# Patient Record
Sex: FEMALE | Race: BLACK OR AFRICAN AMERICAN | NOT HISPANIC OR LATINO
[De-identification: names, ages, dates, MRNs, and addresses within clinical notes are randomized per-mention and may not be internally consistent; named-entity substitution may affect disease eponyms.]

---

## 2022-10-20 ENCOUNTER — TRANSCRIPTION ENCOUNTER (OUTPATIENT)
Age: 32
End: 2022-10-20

## 2022-10-21 ENCOUNTER — TRANSCRIPTION ENCOUNTER (OUTPATIENT)
Age: 32
End: 2022-10-21

## 2022-10-21 ENCOUNTER — RESULT REVIEW (OUTPATIENT)
Age: 32
End: 2022-10-21

## 2022-10-21 ENCOUNTER — INPATIENT (INPATIENT)
Facility: HOSPITAL | Age: 32
LOS: 0 days | Discharge: HOME | End: 2022-10-22
Attending: STUDENT IN AN ORGANIZED HEALTH CARE EDUCATION/TRAINING PROGRAM | Admitting: STUDENT IN AN ORGANIZED HEALTH CARE EDUCATION/TRAINING PROGRAM

## 2022-10-21 VITALS
HEART RATE: 73 BPM | TEMPERATURE: 98 F | DIASTOLIC BLOOD PRESSURE: 78 MMHG | OXYGEN SATURATION: 99 % | SYSTOLIC BLOOD PRESSURE: 136 MMHG | RESPIRATION RATE: 16 BRPM

## 2022-10-21 DIAGNOSIS — R18.8 OTHER ASCITES: ICD-10-CM

## 2022-10-21 LAB
ALBUMIN SERPL ELPH-MCNC: 4.1 G/DL — SIGNIFICANT CHANGE UP (ref 3.3–5)
ALP SERPL-CCNC: 66 U/L — SIGNIFICANT CHANGE UP (ref 40–120)
ALT FLD-CCNC: 9 U/L — SIGNIFICANT CHANGE UP (ref 4–33)
ANION GAP SERPL CALC-SCNC: 10 MMOL/L — SIGNIFICANT CHANGE UP (ref 7–14)
APPEARANCE UR: CLEAR — SIGNIFICANT CHANGE UP
APTT BLD: 28 SEC — SIGNIFICANT CHANGE UP (ref 27–36.3)
AST SERPL-CCNC: 14 U/L — SIGNIFICANT CHANGE UP (ref 4–32)
BACTERIA # UR AUTO: ABNORMAL
BASOPHILS # BLD AUTO: 0.03 K/UL — SIGNIFICANT CHANGE UP (ref 0–0.2)
BASOPHILS NFR BLD AUTO: 0.5 % — SIGNIFICANT CHANGE UP (ref 0–2)
BILIRUB SERPL-MCNC: 0.3 MG/DL — SIGNIFICANT CHANGE UP (ref 0.2–1.2)
BILIRUB UR-MCNC: NEGATIVE — SIGNIFICANT CHANGE UP
BLD GP AB SCN SERPL QL: NEGATIVE — SIGNIFICANT CHANGE UP
BUN SERPL-MCNC: 6 MG/DL — LOW (ref 7–23)
CALCIUM SERPL-MCNC: 8.9 MG/DL — SIGNIFICANT CHANGE UP (ref 8.4–10.5)
CHLORIDE SERPL-SCNC: 101 MMOL/L — SIGNIFICANT CHANGE UP (ref 98–107)
CO2 SERPL-SCNC: 24 MMOL/L — SIGNIFICANT CHANGE UP (ref 22–31)
COLOR SPEC: ABNORMAL
CREAT SERPL-MCNC: 0.74 MG/DL — SIGNIFICANT CHANGE UP (ref 0.5–1.3)
DIFF PNL FLD: ABNORMAL
EGFR: 111 ML/MIN/1.73M2 — SIGNIFICANT CHANGE UP
EOSINOPHIL # BLD AUTO: 0.06 K/UL — SIGNIFICANT CHANGE UP (ref 0–0.5)
EOSINOPHIL NFR BLD AUTO: 1 % — SIGNIFICANT CHANGE UP (ref 0–6)
EPI CELLS # UR: 5 /HPF — SIGNIFICANT CHANGE UP (ref 0–5)
GLUCOSE SERPL-MCNC: 106 MG/DL — HIGH (ref 70–99)
GLUCOSE UR QL: NEGATIVE — SIGNIFICANT CHANGE UP
HCG SERPL-ACNC: 1143 MIU/ML — SIGNIFICANT CHANGE UP
HCT VFR BLD CALC: 32.9 % — LOW (ref 34.5–45)
HGB BLD-MCNC: 9.8 G/DL — LOW (ref 11.5–15.5)
IANC: 4.38 K/UL — SIGNIFICANT CHANGE UP (ref 1.8–7.4)
IMM GRANULOCYTES NFR BLD AUTO: 0.3 % — SIGNIFICANT CHANGE UP (ref 0–0.9)
INR BLD: 1.19 RATIO — HIGH (ref 0.88–1.16)
KETONES UR-MCNC: ABNORMAL
LEUKOCYTE ESTERASE UR-ACNC: ABNORMAL
LYMPHOCYTES # BLD AUTO: 1.1 K/UL — SIGNIFICANT CHANGE UP (ref 1–3.3)
LYMPHOCYTES # BLD AUTO: 18.4 % — SIGNIFICANT CHANGE UP (ref 13–44)
MCHC RBC-ENTMCNC: 23.3 PG — LOW (ref 27–34)
MCHC RBC-ENTMCNC: 29.8 GM/DL — LOW (ref 32–36)
MCV RBC AUTO: 78.1 FL — LOW (ref 80–100)
MONOCYTES # BLD AUTO: 0.38 K/UL — SIGNIFICANT CHANGE UP (ref 0–0.9)
MONOCYTES NFR BLD AUTO: 6.4 % — SIGNIFICANT CHANGE UP (ref 2–14)
NEUTROPHILS # BLD AUTO: 4.38 K/UL — SIGNIFICANT CHANGE UP (ref 1.8–7.4)
NEUTROPHILS NFR BLD AUTO: 73.4 % — SIGNIFICANT CHANGE UP (ref 43–77)
NITRITE UR-MCNC: NEGATIVE — SIGNIFICANT CHANGE UP
NRBC # BLD: 0 /100 WBCS — SIGNIFICANT CHANGE UP (ref 0–0)
NRBC # FLD: 0 K/UL — SIGNIFICANT CHANGE UP (ref 0–0)
PH UR: 6 — SIGNIFICANT CHANGE UP (ref 5–8)
PLATELET # BLD AUTO: 409 K/UL — HIGH (ref 150–400)
POTASSIUM SERPL-MCNC: 3.5 MMOL/L — SIGNIFICANT CHANGE UP (ref 3.5–5.3)
POTASSIUM SERPL-SCNC: 3.5 MMOL/L — SIGNIFICANT CHANGE UP (ref 3.5–5.3)
PROT SERPL-MCNC: 7.4 G/DL — SIGNIFICANT CHANGE UP (ref 6–8.3)
PROT UR-MCNC: ABNORMAL
PROTHROM AB SERPL-ACNC: 13.8 SEC — HIGH (ref 10.5–13.4)
RBC # BLD: 4.21 M/UL — SIGNIFICANT CHANGE UP (ref 3.8–5.2)
RBC # FLD: 16.8 % — HIGH (ref 10.3–14.5)
RBC CASTS # UR COMP ASSIST: 3 /HPF — SIGNIFICANT CHANGE UP (ref 0–4)
RH IG SCN BLD-IMP: POSITIVE — SIGNIFICANT CHANGE UP
RH IG SCN BLD-IMP: POSITIVE — SIGNIFICANT CHANGE UP
SARS-COV-2 RNA SPEC QL NAA+PROBE: SIGNIFICANT CHANGE UP
SODIUM SERPL-SCNC: 135 MMOL/L — SIGNIFICANT CHANGE UP (ref 135–145)
SP GR SPEC: 1 — LOW (ref 1.01–1.05)
UROBILINOGEN FLD QL: SIGNIFICANT CHANGE UP
WBC # BLD: 5.97 K/UL — SIGNIFICANT CHANGE UP (ref 3.8–10.5)
WBC # FLD AUTO: 5.97 K/UL — SIGNIFICANT CHANGE UP (ref 3.8–10.5)
WBC UR QL: 9 /HPF — HIGH (ref 0–5)

## 2022-10-21 PROCEDURE — 99285 EMERGENCY DEPT VISIT HI MDM: CPT

## 2022-10-21 PROCEDURE — 88305 TISSUE EXAM BY PATHOLOGIST: CPT | Mod: 26

## 2022-10-21 PROCEDURE — 76817 TRANSVAGINAL US OBSTETRIC: CPT | Mod: 26

## 2022-10-21 PROCEDURE — 58120 DILATION AND CURETTAGE: CPT

## 2022-10-21 PROCEDURE — 49322 LAPAROSCOPY ASPIRATION: CPT

## 2022-10-21 RX ORDER — FENTANYL CITRATE 50 UG/ML
25 INJECTION INTRAVENOUS
Refills: 0 | Status: DISCONTINUED | OUTPATIENT
Start: 2022-10-21 | End: 2022-10-22

## 2022-10-21 RX ORDER — OXYCODONE HYDROCHLORIDE 5 MG/1
5 TABLET ORAL ONCE
Refills: 0 | Status: DISCONTINUED | OUTPATIENT
Start: 2022-10-21 | End: 2022-10-22

## 2022-10-21 RX ORDER — HYDROMORPHONE HYDROCHLORIDE 2 MG/ML
0.5 INJECTION INTRAMUSCULAR; INTRAVENOUS; SUBCUTANEOUS
Refills: 0 | Status: DISCONTINUED | OUTPATIENT
Start: 2022-10-21 | End: 2022-10-22

## 2022-10-21 RX ORDER — OXYCODONE HYDROCHLORIDE 5 MG/1
1 TABLET ORAL
Qty: 5 | Refills: 0
Start: 2022-10-21

## 2022-10-21 RX ORDER — SODIUM CHLORIDE 9 MG/ML
1000 INJECTION INTRAMUSCULAR; INTRAVENOUS; SUBCUTANEOUS ONCE
Refills: 0 | Status: COMPLETED | OUTPATIENT
Start: 2022-10-21 | End: 2022-10-21

## 2022-10-21 RX ORDER — HYDROMORPHONE HYDROCHLORIDE 2 MG/ML
0.5 INJECTION INTRAMUSCULAR; INTRAVENOUS; SUBCUTANEOUS
Refills: 0 | Status: DISCONTINUED | OUTPATIENT
Start: 2022-10-21 | End: 2022-10-21

## 2022-10-21 RX ORDER — ACETAMINOPHEN 500 MG
1000 TABLET ORAL ONCE
Refills: 0 | Status: COMPLETED | OUTPATIENT
Start: 2022-10-21 | End: 2022-10-21

## 2022-10-21 RX ORDER — SODIUM CHLORIDE 9 MG/ML
1000 INJECTION, SOLUTION INTRAVENOUS
Refills: 0 | Status: DISCONTINUED | OUTPATIENT
Start: 2022-10-21 | End: 2022-10-21

## 2022-10-21 RX ORDER — SODIUM CHLORIDE 9 MG/ML
1000 INJECTION, SOLUTION INTRAVENOUS
Refills: 0 | Status: DISCONTINUED | OUTPATIENT
Start: 2022-10-21 | End: 2022-10-22

## 2022-10-21 RX ORDER — ONDANSETRON 8 MG/1
4 TABLET, FILM COATED ORAL ONCE
Refills: 0 | Status: DISCONTINUED | OUTPATIENT
Start: 2022-10-21 | End: 2022-10-22

## 2022-10-21 RX ADMIN — SODIUM CHLORIDE 1000 MILLILITER(S): 9 INJECTION INTRAMUSCULAR; INTRAVENOUS; SUBCUTANEOUS at 15:54

## 2022-10-21 RX ADMIN — Medication 400 MILLIGRAM(S): at 15:54

## 2022-10-21 RX ADMIN — SODIUM CHLORIDE 1000 MILLILITER(S): 9 INJECTION INTRAMUSCULAR; INTRAVENOUS; SUBCUTANEOUS at 18:48

## 2022-10-21 RX ADMIN — SODIUM CHLORIDE 125 MILLILITER(S): 9 INJECTION, SOLUTION INTRAVENOUS at 23:30

## 2022-10-21 NOTE — ED PROVIDER NOTE - CLINICAL SUMMARY MEDICAL DECISION MAKING FREE TEXT BOX
30 yo  female PMhx asthma, presenting with lower  abdominal pain, vaginal bleeding since Tuesday. Concern for missed  vs ectopic, vs other causes of vaginal bleeding such as fibroids, polyps, etc. Will get CBC, CMP, type and screen, UA/UC, and TVUS.

## 2022-10-21 NOTE — ED ADULT TRIAGE NOTE - CHIEF COMPLAINT QUOTE
Pt c/o abd pain, reports she went to urgent care and was told she was pregnant-sent to ER for US. endorsing vaginal bleeding. reports LMP late july- states "they are irregular."

## 2022-10-21 NOTE — ED PROVIDER NOTE - OBJECTIVE STATEMENT
30 yo  female PMhx asthma, presenting with lower  abdominal pain, vaginal bleeding since Tuesday. Patient went to urgent care, where they told her she was pregnant today. Patient with irregular periods, unsure LMP (possibly July). No fever. Pain described as intense cramping. Patient has been experience vomiting only Patient denies lightheadedness, CP, SOB, and dysuria. 32 yo  female PMhx asthma, presenting with lower  abdominal pain, vaginal bleeding since Tuesday. Patient went to urgent care, where they told her she was pregnant today. Patient with irregular periods, unsure LMP (possibly July). No fever. Pain described as intense cramping. Patient has been experience vomiting only with pain medication. Patient denies lightheadedness, CP, SOB, and dysuria.

## 2022-10-21 NOTE — ED PROVIDER NOTE - PROGRESS NOTE DETAILS
MD Alice (PGY-1) Free fluid in abdomen on US. Concern for ectopic vs hemorraghic cyst. Patient reassessed and stable. Denies pain. OB was consulted. Plan for OR. Will get pre-op labs and COVID.

## 2022-10-21 NOTE — CHART NOTE - NSCHARTNOTEFT_GEN_A_CORE
Attending Note     Pt seen and examined    LMP 2022 presents to the ER with 3 days of abdominal pain and vaginal bleeding   Today she went to urgent care and was noted to be pregnant  She was sent to the ER where she was noted to have small amount of vaginal bleeding and diffuse lower abdominal pain   undesired pregnancy   TVUS + free fluid concerning for small hemoperitoneum, thickened endometrium   SSE 1 cm dilated with blood   hcg 1100 and Rh +   Discussed with pt diagnosis of pregnancy of unknown location and hemoperitoneum.   Recommend diagnostic laparoscopy to r/o ruptured ectopic  and D & C to evaluate for IUP   PT is aware of exam under anesthesia by learners  Reviewed with pt risks of the procedure including but not limited bleeding, infection, damage to surrounding organs and uterine perforation.    ROSALES Khanna MD

## 2022-10-21 NOTE — BRIEF OPERATIVE NOTE - NSICDXBRIEFPROCEDURE_GEN_ALL_CORE_FT
PROCEDURES:  Diagnostic laparoscopy in female 21-Oct-2022 23:25:10  Kala Bello&JUDY (dilatation and curettage, scraping of uterus) 21-Oct-2022 23:26:12  Kala Bello

## 2022-10-21 NOTE — ED PROVIDER NOTE - PHYSICAL EXAMINATION
Const: not in acute distress  Eyes: no conjunctival injection  HEENT: Head NCAT, Moist MM.  Neck: Trachea midline.   CVS: +S1/S2, Peripheral pulses 2+ and equal in all extremities.  RESP: Unlabored respiratory effort. Clear to auscultation bilaterally.  GI: Tender in mid lower abdomen/Nondistended, No CVA tenderness b/l.   : Cervical os open 1 cm at most, no adnexal tenderness  Chaperone: Helen Bloch  MSK: Normocephalic/Atraumatic, No Lower Extremities edema b/l.   Skin: Intact.   Neuro: CNs II-XII grossly intact. Motor & Sensation grossly intact.  Psych: Awake, Alert, & Oriented (AAO) x3.

## 2022-10-21 NOTE — H&P ADULT - NSHPPOADEEPVENOUSTHROMB_GEN_A_CORE
no
Cardiomyopathy, Idiopathic  follows with [facility]  Cerebral Palsy  since  birth  Cholelithiasis, resolved s/p cholecystectomy    Chronic Lung Disease of Premat  follows with [facility] Pulmonology  CVA (Cerebral Vascular Accident)  Onset date unknown, noted on MRI from 5/2010  Hydrocephalus    Reactive Airway Disease  receives albuterol and xoponex treatments at home  S/P  Shunt  x2 with multiple shunt revisions  Sickle Cell Disease    Strabismus  s/p surgery

## 2022-10-21 NOTE — ED ADULT NURSE NOTE - OBJECTIVE STATEMENT
a&ox3, c/o abd pain with vag bleeding since Tuesday. denies fever, chilles or dysuria. known preg [facility]. denies heavy bleeding at present. resp even and unlabored. well appearing. bld sent to lab as ordered and meds given as ordered

## 2022-10-21 NOTE — ED PROVIDER NOTE - ATTENDING CONTRIBUTION TO CARE
DR. BLOCH, ATTENDING MD-  I performed a face to face bedside interview with patient regarding history of present illness, review of symptoms and past medical history. I completed an independent physical exam.  I have discussed patient's plan of care with the resident.  patient examined well appearing NAD HEENT nml heart s1s2. lungs clear abd soft tender supra pubic area, no CVA tenderness, extrem no edema, pelvic supervised, no tenderness. exam as per resident

## 2022-10-21 NOTE — H&P ADULT - NSHPLABSRESULTS_GEN_ALL_CORE
LABS:                        9.8    5.97  )-----------( 409      ( 21 Oct 2022 16:13 )             32.9     10-    135  |  101  |  6<L>  ----------------------------<  106<H>  3.5   |  24  |  0.74    Ca    8.9      21 Oct 2022 16:13    TPro  7.4  /  Alb  4.1  /  TBili  0.3  /  DBili  x   /  AST  14  /  ALT  9   /  AlkPhos  66  10-    Urinalysis Basic - ( 21 Oct 2022 16:13 )    Color: Light Brown / Appearance: Clear / S.004 / pH: x  Gluc: x / Ketone: Trace  / Bili: Negative / Urobili: <2 mg/dL   Blood: x / Protein: 30 mg/dL / Nitrite: Negative   Leuk Esterase: Large / RBC: 3 /HPF / WBC 9 /HPF   Sq Epi: x / Non Sq Epi: 5 /HPF / Bacteria: Occasional          RADIOLOGY STUDIES:  < from: US Transvaginal, OB (10.21.22 @ 16:34) >  ACC: [id number] EXAM:  US OB TRANSVAGINAL                          PROCEDURE DATE:  10/21/2022          INTERPRETATION:  CLINICAL INFORMATION: Vaginal bleeding. Lower   abdominopelvic pain. Positive urine pregnancy test.    LMP: Irregular menses, possibly 2022    COMPARISON: None available.    TECHNIQUE:  Endovaginal pelvic sonogram only. Color and Spectral Doppler was   performed.    FINDINGS:  Uterus: 10.3 cm x 5.8 cm x 5.7 cm. No focal myometrial mass. No   intrauterine gestation is identified.  Endometrium: The endometrial cavity is expanded with avascular echogenic   material, likely reflecting blood products. The endometrium is difficult   to delineate from the presumed blood products, measuring approximately 5   mm in thickness.    Right ovary: 3.3 cm x 2.1 cm x 1.7 cm. Within normal limits. Normal   arterial and venous waveforms.  Left ovary: 4.1 cm x 1.8 cm x 2.0 cm. Within normal limits. There is a   left corpus luteum that measures 1.5 x 1.3 x 0.9 cm. Normal arterial and   venous waveforms.    No adnexal mass identified.    Fluid: Small volume of complex fluid with diffuse low-level echoes   visualized along the bilateral adnexa and cul-de-sac.    IMPRESSION:  No intrauterine or extrauterine pregnancy identified, representing a   pregnancy of unknown location. Differential includes an early normal   pregnancy, failed pregnancy, or occult ectopic pregnancy.    Expanded endometrial cavity containing avascular echogenic material,   consistent with blood products. Small volume of complex pelvic fluid,   concerning for hemoperitoneum. A ruptured occult ectopic pregnancy is not   excluded.    Findings were discussed with Dr. Bloch 10/21/2022 4:52 PM by Dr. Mcnamara with read back confirmation.    --- End of Report ---            MARY MCNAMARA MD; Attending Radiologist  This document has been electronically signed. Oct 21 2022  4:56PM    < end of copied text > LABS:    Type + Screen (10.21.22 @ 17:12)   ABO Interpretation: B   Rh Interpretation: Positive   Antibody Screen: Negative     HCG Quantitative, Serum (10.21.22 @ 16:13)   HCG Quantitative, Serum: 1143.0                        9.8    5.97  )-----------( 409      ( 21 Oct 2022 16:13 )             32.9     10    135  |  101  |  6<L>  ----------------------------<  106<H>  3.5   |  24  |  0.74    Ca    8.9      21 Oct 2022 16:13    TPro  7.4  /  Alb  4.1  /  TBili  0.3  /  DBili  x   /  AST  14  /  ALT  9   /  AlkPhos  66  10-21    Urinalysis Basic - ( 21 Oct 2022 16:13 )    Color: Light Brown / Appearance: Clear / S.004 / pH: x  Gluc: x / Ketone: Trace  / Bili: Negative / Urobili: <2 mg/dL   Blood: x / Protein: 30 mg/dL / Nitrite: Negative   Leuk Esterase: Large / RBC: 3 /HPF / WBC 9 /HPF   Sq Epi: x / Non Sq Epi: 5 /HPF / Bacteria: Occasional          RADIOLOGY STUDIES:  < from: US Transvaginal, OB (10.21.22 @ 16:34) >  ACC: [id number] EXAM:  US OB TRANSVAGINAL                          PROCEDURE DATE:  10/21/2022          INTERPRETATION:  CLINICAL INFORMATION: Vaginal bleeding. Lower   abdominopelvic pain. Positive urine pregnancy test.    LMP: Irregular menses, possibly 2022    COMPARISON: None available.    TECHNIQUE:  Endovaginal pelvic sonogram only. Color and Spectral Doppler was   performed.    FINDINGS:  Uterus: 10.3 cm x 5.8 cm x 5.7 cm. No focal myometrial mass. No   intrauterine gestation is identified.  Endometrium: The endometrial cavity is expanded with avascular echogenic   material, likely reflecting blood products. The endometrium is difficult   to delineate from the presumed blood products, measuring approximately 5   mm in thickness.    Right ovary: 3.3 cm x 2.1 cm x 1.7 cm. Within normal limits. Normal   arterial and venous waveforms.  Left ovary: 4.1 cm x 1.8 cm x 2.0 cm. Within normal limits. There is a   left corpus luteum that measures 1.5 x 1.3 x 0.9 cm. Normal arterial and   venous waveforms.    No adnexal mass identified.    Fluid: Small volume of complex fluid with diffuse low-level echoes   visualized along the bilateral adnexa and cul-de-sac.    IMPRESSION:  No intrauterine or extrauterine pregnancy identified, representing a   pregnancy of unknown location. Differential includes an early normal   pregnancy, failed pregnancy, or occult ectopic pregnancy.    Expanded endometrial cavity containing avascular echogenic material,   consistent with blood products. Small volume of complex pelvic fluid,   concerning for hemoperitoneum. A ruptured occult ectopic pregnancy is not   excluded.    Findings were discussed with Dr. Bloch 10/21/2022 4:52 PM by Dr. Mcnamara with read back confirmation.    --- End of Report ---            MARY MCNAMARA MD; Attending Radiologist  This document has been electronically signed. Oct 21 2022  4:56PM    < end of copied text >

## 2022-10-21 NOTE — H&P ADULT - NSHPPHYSICALEXAM_GEN_ALL_CORE
Vital Signs Last 24 Hrs  T(C): 36.7 (21 Oct 2022 17:00), Max: 36.7 (21 Oct 2022 13:26)  T(F): 98 (21 Oct 2022 17:00), Max: 98.1 (21 Oct 2022 13:26)  HR: 87 (21 Oct 2022 17:00) (73 - 87)  BP: 134/80 (21 Oct 2022 17:00) (134/80 - 136/78)  RR: 17 (21 Oct 2022 17:00) (16 - 17)  SpO2: 100% (21 Oct 2022 17:00) (99% - 100%)    Parameters below as of 21 Oct 2022 17:00  Patient On (Oxygen Delivery Method): room air         PHYSICAL EXAM:  CHEST/LUNG: Clear to percussion bilaterally; No rales, rhonchi, wheezing, or rubs  HEART: Regular rate and rhythm; No murmurs, rubs, or gallops  ABDOMEN: Soft, Nontender, Nondistended; Bowel sounds present  EXTREMITIES:  2+ Peripheral Pulses, No clubbing, cyanosis, or edema  PELVIC:        EXTERNAL GENITALIA: Normal. No rashes or lesions noted.         VAGINA: healthy pink mucosa, no gross lesions, no discharge noted, no blood noted.          CERVIX: no lesions. closed, no active bleeding through os. no CMT noted.        UTERUS: normal size, nontender, mobile        ADNEXA: no adnexal masses or tenderness appreciated. Vital Signs Last 24 Hrs  T(C): 36.7 (21 Oct 2022 17:00), Max: 36.7 (21 Oct 2022 13:26)  T(F): 98 (21 Oct 2022 17:00), Max: 98.1 (21 Oct 2022 13:26)  HR: 87 (21 Oct 2022 17:00) (73 - 87)  BP: 134/80 (21 Oct 2022 17:00) (134/80 - 136/78)  RR: 17 (21 Oct 2022 17:00) (16 - 17)  SpO2: 100% (21 Oct 2022 17:00) (99% - 100%)    Parameters below as of 21 Oct 2022 17:00  Patient On (Oxygen Delivery Method): room air         PHYSICAL EXAM:  ABDOMEN: Soft, Nontender, Mildly distended;   EXTREMITIES:  2+ Peripheral Pulses, No clubbing, cyanosis, or edema  PELVIC:        EXTERNAL GENITALIA: Normal. No rashes or lesions noted.         VAGINA: healthy pink mucosa, no gross lesions, no discharge noted, minimal blood noted in vaginal vault, negative pooling         CERVIX: no lesions. 0.5cm dilated. small clot noted at external cervical os. no active bleeding through os. no CMT noted.        UTERUS: normal size, nontender, mobile        ADNEXA: no adnexal masses or tenderness appreciated.

## 2022-10-21 NOTE — ED PROVIDER NOTE - NS ED ROS FT
CONST: no fevers, no chills, no trauma  EYES: no blurry vision   ENT: no sore throat  CV: no chest pain, no extremity swelling  RESP: no shortness of breath  ABD: no abdominal pain, no nausea, (+)  vomiting, no diarrhea, no melena  : no dysuria, no hematuria, no frequency  MSK: no back pain, no neck pain, no extremity pain  NEURO: no headache, no focal weakness, no dizziness  HEME: no bruising  SKIN: no rash CONST: no fevers, no chills, no trauma  EYES: no blurry vision   ENT: no sore throat  CV: no chest pain, no extremity swelling  RESP: no shortness of breath  ABD: (+) abdominal pain, no nausea, (+)  vomiting, no diarrhea, no melena  : no dysuria, no hematuria, no frequency, (+) vaginal bleeding  MSK: no back pain, no neck pain, no extremity pain  NEURO: no headache, no focal weakness, no dizziness  HEME: no bruising  SKIN: no rash

## 2022-10-21 NOTE — H&P ADULT - NSVTERISKREFERASSESS_GEN_ALL_CORE
1355 Patient presented to [facility] ED with complaint of lower abdominal pain and decreased fetal movement; started [facility] 6/3/2018; current S OB/GYN patient; denied abnormalities or concerns w/pregnancy; denied medical/surgical history; denied hx of verbal/physical/sexual abuse       06/04/18 1400   Maternal Vital Signs   Temp 98 °F (36.7 °C)   Temp Source Oral   Pulse (Heart Rate) 88   Resp Rate 18   O2 Sat (%) 99 %   Level of Consciousness Alert   /77   MAP (Calculated) 90   BP 1 Method Automatic   BP 1 Location Left arm   BP Patient Position At rest;Head of bed elevated (Comment degrees)   MEWS Score 1   Fetal Vital Signs   Mode External   Fetal Heart Rate 140   Uterine Activity   Uterine Resting Tone Relaxed   Pain 1   Pain Scale 1 Numeric (0 - 10)   Pain Intensity 1 8   Patient Stated Pain Goal 0   Pain Onset 1 0500   Pain Location 1 Abdomen   Pain Orientation 1 Lower   Pain Description 1 Constant; Aching; Throbbing   Spinal Dermatomes   Motor Function Ambulate w/o assistance   Oxygen Therapy   O2 Device Room air   Vaginal Discharge   Vaginal Discharge No   Vaginal Bleeding   Vaginal Bleeding No   Pre-Eclampsia Assessment   Headache No   Visual Disturbances No   Epigastric Pain No Refer to the Assessment tab to view/cancel completed assessment.

## 2022-10-21 NOTE — H&P ADULT - HISTORY OF PRESENT ILLNESS
HPI:     31y G_P_  Last Menstrual Period   presents with       PMHX;  PSHX;  POBHX;  PGYNHX:  SOCIAL:  Allergies    No Known Allergies    Intolerances      MEDS:      Vital Signs Last 24 Hrs  T(C): 36.7 (21 Oct 2022 17:00), Max: 36.7 (21 Oct 2022 13:26)  T(F): 98 (21 Oct 2022 17:00), Max: 98.1 (21 Oct 2022 13:26)  HR: 87 (21 Oct 2022 17:00) (73 - 87)  BP: 134/80 (21 Oct 2022 17:00) (134/80 - 136/78)  BP(mean): --  RR: 17 (21 Oct 2022 17:00) (16 - 17)  SpO2: 100% (21 Oct 2022 17:00) (99% - 100%)    Parameters below as of 21 Oct 2022 17:00  Patient On (Oxygen Delivery Method): room air         PHYSICAL EXAM:  CHEST/LUNG: Clear to percussion bilaterally; No rales, rhonchi, wheezing, or rubs  HEART: Regular rate and rhythm; No murmurs, rubs, or gallops  ABDOMEN: Soft, Nontender, Nondistended; Bowel sounds present  EXTREMITIES:  2+ Peripheral Pulses, No clubbing, cyanosis, or edema  PELVIC:        EXTERNAL GENITALIA: Normal. No rashes or lesions noted.         VAGINA: healthy pink mucosa, no gross lesions, no discharge noted, no blood noted.          CERVIX: no lesions. closed, no active bleeding through os. no CMt noted.        UTERUS: normal size, nontender, mobile        ADNEXA: no adnexal masses or tenderness appreciated.     HPI: 31y  (Last Menstrual Period: 2022) w/ PMHx of asthma presents with vaginal bleeding and lower abdominal pain x4 days. Patient's pain started on Tuesday as well as bleeding. Patient states that at that time the pain was 12/10. the pain eventually resolved and decreased to 10/10 on Wednesday. She noticed that she had heavy bleeding as well as a return in the severity of the pain and presented to the urgent care, where they told her she was pregnant today. Patient states that over the past 3 days she passed small clots and had bleeding as heavy as her normal period. Patient has a history of irregular periods, and so she is unsure of her LMP, though reports it was last in July of this year. She denies any use of birth control. She states that she vomited twice while trying to take pain medication earlier in the week, though denies fever, chills, chest pain, shortness of breath, diarrhea or constipation. She also denies lightheadedness, dizziness or dysuria.    PMHX; Asthma  PSHX; denies surgical hx  POBHX;   PGYNHX: denies hx of fibroids, ovarian cysts, endometriosis,   SOCIAL: denies e/t/d use  Allergies: NKDA  MEDS: Tylenol

## 2022-10-21 NOTE — BRIEF OPERATIVE NOTE - OPERATION/FINDINGS
Grossly normal uterus, fallopian tubes, and ovaries bilaterally. D&C completed and products floated. EMB sent to pathology. Grossly normal uterus, fallopian tubes, and ovaries bilaterally. D&C completed and products floated. EMC sent to pathology.

## 2022-10-21 NOTE — ASU DISCHARGE PLAN (ADULT/PEDIATRIC) - NURSING INSTRUCTIONS
Over the counter Pain medications are Motrin 600mg orally every 6 hours and Tylenol 1000mg orally every 6 hours if needed.    Next dose of Tylenol 1000mg, orally is at 4am if needed.     Next dose of Motrin 600mg, orally is at 5am if needed.

## 2022-10-21 NOTE — ED ADULT NURSE REASSESSMENT NOTE - NS ED NURSE REASSESS COMMENT FT1
a&ox4, resting in bed. denies pain or increasing vag bleeding. well appearing. stated she feels fine. oob, steady on her feet. resp even and unlabored.

## 2022-10-21 NOTE — H&P ADULT - ASSESSMENT
Plan for OR:   Neuro: IV pain medication prn  CV: Patient hemodynamically unstable.  Currently recieving ......      Anesthesia to be at bedside to stabilize patient.    vs. Patient hemodynamically stable- will continue to monitor vitals closely.   Pulm: saturating well on room air   GI: NPO for OR   : Perez to be placed intra-operatively.   Reproductive: -Ruptured ectopic pregnancy  vs.  Ectopic pregnancy (not candidate for medical therapy):  patient to go to OR for diagnostic laparoscopy, unilateral salpingectomy, possible unilateral oopherectomy, possible exploratory laparotomy.  Patient counseled on risks of surgery including bleeding, infection and damage to surrounding organs.  All questions/concerns of patient addressed. All consents signed with patient.    Heme: SCD's in OR for DVT ppx.  Aggressive and early ambulation post-operatively for DVT ppx.   ID: afebrile   FEN: LR@125.  Replete electrolytes prn   Dispo: To OR for procedure as detailed above      Patient seen and d/w     Nuha Del Cid, PGY2   31y  (Last Menstrual Period: 2022) w/ PMHx of asthma presents with vaginal bleeding and lower abdominal pain x4 days with imaging findings concerning for a ruptured ectopic pregnancy. Patient is hemodynamically stable, afebrile, H/H 9.8/32.9. Coags wnl. PE findings consistent with potential SAB vs ectopic pregnancy given clots observed at cervix. Patient mildly tender to deep palpation across lower abdomen with mild abdominal distension. TVUS findings significant for: No intrauterine or extrauterine pregnancy identified, representing a   pregnancy of unknown location. Differential includes an early normal pregnancy, failed pregnancy, or occult ectopic pregnancy. Expanded endometrial cavity containing avascular echogenic material, consistent with blood products. Small volume of complex pelvic fluid, concerning for hemoperitoneum. A ruptured occult ectopic pregnancy is not excluded. Patient is a surgical candidate for evacuation of hemoperitoneum with D+C, diagnostic laparoscopy, poss unilateral salpingectomy, possible ovarian cystectomy, possible oophorectomy, possible exploratory laparotomy.     Plan for OR: for evacuation of hemoperitoneum with D+C, diagnostic laparoscopy, poss unilateral salpingectomy, possible ovarian cystectomy, possible oophorectomy, possible exploratory laparotomy.     Neuro: IV pain medication prn  CV: Patient hemodynamically stable- will continue to monitor vitals closely.   Pulm: saturating well on room air   GI: NPO for OR   : Perez to be placed intra-operatively.   Reproductive: -Suspected Ruptured ectopic pregnancy: patient to go to OR for D+C, diagnostic laparoscopy, evacuation of hemoperitoneum, poss unilateral salpingectomy, possible ovarian cystectomy, possible oophorectomy, possible exploratory laparotomy. Patient counseled on risks of surgery including bleeding, infection and damage to surrounding organs.  All questions/concerns of patient addressed. All consents signed with patient.    Heme: SCD's in OR for DVT ppx.  Aggressive and early ambulation post-operatively for DVT ppx. Coags, T&S and CBC sent.   ID: afebrile   FEN: LR@125.  Replete electrolytes prn   Dispo: To OR for procedure as detailed above      Patient seen and d/w   Dr. Dubon-Percy Del Cid, PGY2   31y  (Last Menstrual Period: 2022) w/ PMHx of asthma presents with vaginal bleeding and lower abdominal pain x4 days with imaging findings concerning for a ruptured ectopic pregnancy. Patient is hemodynamically stable, afebrile, H/H 9.8/32.9. Coags wnl. HC. PE findings consistent with potential SAB vs ectopic pregnancy given clots observed at cervix. Patient mildly tender to deep palpation across lower abdomen with mild abdominal distension. TVUS findings significant for: No intrauterine or extrauterine pregnancy identified, representing a   pregnancy of unknown location. Differential includes an early normal pregnancy, failed pregnancy, or occult ectopic pregnancy. Expanded endometrial cavity containing avascular echogenic material, consistent with blood products. Small volume of complex pelvic fluid, concerning for hemoperitoneum. A ruptured occult ectopic pregnancy is not excluded. Patient is a surgical candidate for evacuation of hemoperitoneum with D+C, diagnostic laparoscopy, poss unilateral salpingectomy, possible ovarian cystectomy, possible oophorectomy, possible exploratory laparotomy.     Plan for OR: for evacuation of hemoperitoneum with D+C, diagnostic laparoscopy, poss unilateral salpingectomy, possible ovarian cystectomy, possible oophorectomy, possible exploratory laparotomy.     Neuro: IV pain medication prn  CV: Patient hemodynamically stable- will continue to monitor vitals closely.   Pulm: saturating well on room air   GI: NPO for OR   : Perez to be placed intra-operatively.   Reproductive: -Suspected Ruptured ectopic pregnancy: patient to go to OR for D+C, diagnostic laparoscopy, evacuation of hemoperitoneum, poss unilateral salpingectomy, possible ovarian cystectomy, possible oophorectomy, possible exploratory laparotomy. Patient counseled on risks of surgery including bleeding, infection and damage to surrounding organs.  All questions/concerns of patient addressed. All consents signed with patient.    Heme: SCD's in OR for DVT ppx.  Aggressive and early ambulation post-operatively for DVT ppx. Coags, T&S and CBC sent. Type: B+   ID: afebrile   FEN: LR@125.  Replete electrolytes prn   Dispo: To OR for procedure as detailed above      Patient seen and d/w   Dr. Dubon-Percy Del Cid, PGY2   31y  (Last Menstrual Period: 2022) w/ PMHx of asthma presents with vaginal bleeding and lower abdominal pain x4 days with imaging findings concerning for a ruptured ectopic pregnancy. Patient is hemodynamically stable, afebrile, H/H 9.8/32.9. Coags wnl. HC. PE findings consistent with potential SAB vs ectopic pregnancy given clots observed at cervix. Patient mildly tender to deep palpation across lower abdomen with mild abdominal distension. TVUS findings significant for: No intrauterine or extrauterine pregnancy identified, representing a   pregnancy of unknown location. Differential includes an early normal pregnancy, failed pregnancy, or occult ectopic pregnancy. Expanded endometrial cavity containing avascular echogenic material, consistent with blood products. Small volume of complex pelvic fluid, concerning for hemoperitoneum. A ruptured occult ectopic pregnancy is not excluded. Patient is a surgical candidate for evacuation of hemoperitoneum with D+C, diagnostic laparoscopy, poss unilateral salpingectomy, possible ovarian cystectomy, possible oophorectomy, possible exploratory laparotomy.     Plan for OR: for evacuation of hemoperitoneum with D+C, diagnostic laparoscopy, poss unilateral salpingectomy, possible ovarian cystectomy, possible oophorectomy, possible exploratory laparotomy.

## 2022-10-22 ENCOUNTER — EMERGENCY (EMERGENCY)
Facility: HOSPITAL | Age: 32
LOS: 1 days | Discharge: HOME | End: 2022-10-22
Attending: STUDENT IN AN ORGANIZED HEALTH CARE EDUCATION/TRAINING PROGRAM | Admitting: STUDENT IN AN ORGANIZED HEALTH CARE EDUCATION/TRAINING PROGRAM

## 2022-10-22 VITALS
RESPIRATION RATE: 18 BRPM | TEMPERATURE: 98 F | DIASTOLIC BLOOD PRESSURE: 89 MMHG | HEART RATE: 89 BPM | SYSTOLIC BLOOD PRESSURE: 156 MMHG | OXYGEN SATURATION: 100 %

## 2022-10-22 VITALS
OXYGEN SATURATION: 100 % | RESPIRATION RATE: 14 BRPM | SYSTOLIC BLOOD PRESSURE: 130 MMHG | DIASTOLIC BLOOD PRESSURE: 77 MMHG | HEART RATE: 84 BPM

## 2022-10-22 DIAGNOSIS — Z98.890 OTHER SPECIFIED POSTPROCEDURAL STATES: Chronic | ICD-10-CM

## 2022-10-22 LAB
ALBUMIN SERPL ELPH-MCNC: 3.9 G/DL — SIGNIFICANT CHANGE UP (ref 3.3–5)
ALP SERPL-CCNC: 58 U/L — SIGNIFICANT CHANGE UP (ref 40–120)
ALT FLD-CCNC: 9 U/L — SIGNIFICANT CHANGE UP (ref 4–33)
ANION GAP SERPL CALC-SCNC: 9 MMOL/L — SIGNIFICANT CHANGE UP (ref 7–14)
AST SERPL-CCNC: 16 U/L — SIGNIFICANT CHANGE UP (ref 4–32)
BASE EXCESS BLDV CALC-SCNC: -2.2 MMOL/L — LOW (ref -2–3)
BASOPHILS # BLD AUTO: 0.02 K/UL — SIGNIFICANT CHANGE UP (ref 0–0.2)
BASOPHILS NFR BLD AUTO: 0.2 % — SIGNIFICANT CHANGE UP (ref 0–2)
BILIRUB SERPL-MCNC: 0.2 MG/DL — SIGNIFICANT CHANGE UP (ref 0.2–1.2)
BLD GP AB SCN SERPL QL: NEGATIVE — SIGNIFICANT CHANGE UP
BLOOD GAS VENOUS COMPREHENSIVE RESULT: SIGNIFICANT CHANGE UP
BUN SERPL-MCNC: 7 MG/DL — SIGNIFICANT CHANGE UP (ref 7–23)
CALCIUM SERPL-MCNC: 8.6 MG/DL — SIGNIFICANT CHANGE UP (ref 8.4–10.5)
CHLORIDE BLDV-SCNC: 107 MMOL/L — SIGNIFICANT CHANGE UP (ref 96–108)
CHLORIDE SERPL-SCNC: 105 MMOL/L — SIGNIFICANT CHANGE UP (ref 98–107)
CO2 BLDV-SCNC: 25.1 MMOL/L — SIGNIFICANT CHANGE UP (ref 22–26)
CO2 SERPL-SCNC: 23 MMOL/L — SIGNIFICANT CHANGE UP (ref 22–31)
CREAT SERPL-MCNC: 0.73 MG/DL — SIGNIFICANT CHANGE UP (ref 0.5–1.3)
CULTURE RESULTS: SIGNIFICANT CHANGE UP
EGFR: 113 ML/MIN/1.73M2 — SIGNIFICANT CHANGE UP
EOSINOPHIL # BLD AUTO: 0.01 K/UL — SIGNIFICANT CHANGE UP (ref 0–0.5)
EOSINOPHIL NFR BLD AUTO: 0.1 % — SIGNIFICANT CHANGE UP (ref 0–6)
GAS PNL BLDV: 137 MMOL/L — SIGNIFICANT CHANGE UP (ref 136–145)
GLUCOSE BLDV-MCNC: 95 MG/DL — SIGNIFICANT CHANGE UP (ref 70–99)
GLUCOSE SERPL-MCNC: 103 MG/DL — HIGH (ref 70–99)
HCO3 BLDV-SCNC: 24 MMOL/L — SIGNIFICANT CHANGE UP (ref 22–29)
HCT VFR BLD CALC: 30.2 % — LOW (ref 34.5–45)
HCT VFR BLDA CALC: 25 % — LOW (ref 34.5–46.5)
HGB BLD CALC-MCNC: 8.3 G/DL — LOW (ref 11.5–15.5)
HGB BLD-MCNC: 8.7 G/DL — LOW (ref 11.5–15.5)
IANC: 8.74 K/UL — HIGH (ref 1.8–7.4)
IMM GRANULOCYTES NFR BLD AUTO: 0.4 % — SIGNIFICANT CHANGE UP (ref 0–0.9)
LACTATE BLDV-MCNC: 0.8 MMOL/L — SIGNIFICANT CHANGE UP (ref 0.5–2)
LYMPHOCYTES # BLD AUTO: 1.53 K/UL — SIGNIFICANT CHANGE UP (ref 1–3.3)
LYMPHOCYTES # BLD AUTO: 13.7 % — SIGNIFICANT CHANGE UP (ref 13–44)
MCHC RBC-ENTMCNC: 22.6 PG — LOW (ref 27–34)
MCHC RBC-ENTMCNC: 28.8 GM/DL — LOW (ref 32–36)
MCV RBC AUTO: 78.4 FL — LOW (ref 80–100)
MONOCYTES # BLD AUTO: 0.82 K/UL — SIGNIFICANT CHANGE UP (ref 0–0.9)
MONOCYTES NFR BLD AUTO: 7.3 % — SIGNIFICANT CHANGE UP (ref 2–14)
NEUTROPHILS # BLD AUTO: 8.74 K/UL — HIGH (ref 1.8–7.4)
NEUTROPHILS NFR BLD AUTO: 78.3 % — HIGH (ref 43–77)
NRBC # BLD: 0 /100 WBCS — SIGNIFICANT CHANGE UP (ref 0–0)
NRBC # FLD: 0 K/UL — SIGNIFICANT CHANGE UP (ref 0–0)
PCO2 BLDV: 45 MMHG — HIGH (ref 39–42)
PH BLDV: 7.33 — SIGNIFICANT CHANGE UP (ref 7.32–7.43)
PLATELET # BLD AUTO: 402 K/UL — HIGH (ref 150–400)
PO2 BLDV: 38 MMHG — SIGNIFICANT CHANGE UP
POTASSIUM BLDV-SCNC: 3.7 MMOL/L — SIGNIFICANT CHANGE UP (ref 3.5–5.1)
POTASSIUM SERPL-MCNC: 3.7 MMOL/L — SIGNIFICANT CHANGE UP (ref 3.5–5.3)
POTASSIUM SERPL-SCNC: 3.7 MMOL/L — SIGNIFICANT CHANGE UP (ref 3.5–5.3)
PROT SERPL-MCNC: 6.5 G/DL — SIGNIFICANT CHANGE UP (ref 6–8.3)
RBC # BLD: 3.85 M/UL — SIGNIFICANT CHANGE UP (ref 3.8–5.2)
RBC # FLD: 16.9 % — HIGH (ref 10.3–14.5)
RH IG SCN BLD-IMP: POSITIVE — SIGNIFICANT CHANGE UP
SAO2 % BLDV: 63 % — SIGNIFICANT CHANGE UP
SODIUM SERPL-SCNC: 137 MMOL/L — SIGNIFICANT CHANGE UP (ref 135–145)
SPECIMEN SOURCE: SIGNIFICANT CHANGE UP
WBC # BLD: 11.17 K/UL — HIGH (ref 3.8–10.5)
WBC # FLD AUTO: 11.17 K/UL — HIGH (ref 3.8–10.5)

## 2022-10-22 PROCEDURE — 99285 EMERGENCY DEPT VISIT HI MDM: CPT

## 2022-10-22 PROCEDURE — 74177 CT ABD & PELVIS W/CONTRAST: CPT | Mod: 26,MA

## 2022-10-22 RX ORDER — ONDANSETRON 8 MG/1
4 TABLET, FILM COATED ORAL ONCE
Refills: 0 | Status: COMPLETED | OUTPATIENT
Start: 2022-10-22 | End: 2022-10-22

## 2022-10-22 RX ORDER — SODIUM CHLORIDE 9 MG/ML
1000 INJECTION INTRAMUSCULAR; INTRAVENOUS; SUBCUTANEOUS ONCE
Refills: 0 | Status: COMPLETED | OUTPATIENT
Start: 2022-10-22 | End: 2022-10-22

## 2022-10-22 RX ORDER — IOHEXOL 300 MG/ML
30 INJECTION, SOLUTION INTRAVENOUS ONCE
Refills: 0 | Status: COMPLETED | OUTPATIENT
Start: 2022-10-22 | End: 2022-10-22

## 2022-10-22 RX ORDER — MORPHINE SULFATE 50 MG/1
4 CAPSULE, EXTENDED RELEASE ORAL ONCE
Refills: 0 | Status: DISCONTINUED | OUTPATIENT
Start: 2022-10-22 | End: 2022-10-22

## 2022-10-22 RX ORDER — KETOROLAC TROMETHAMINE 30 MG/ML
15 SYRINGE (ML) INJECTION ONCE
Refills: 0 | Status: DISCONTINUED | OUTPATIENT
Start: 2022-10-22 | End: 2022-10-22

## 2022-10-22 RX ADMIN — IOHEXOL 30 MILLILITER(S): 300 INJECTION, SOLUTION INTRAVENOUS at 21:55

## 2022-10-22 RX ADMIN — MORPHINE SULFATE 4 MILLIGRAM(S): 50 CAPSULE, EXTENDED RELEASE ORAL at 21:02

## 2022-10-22 RX ADMIN — Medication 15 MILLIGRAM(S): at 19:11

## 2022-10-22 RX ADMIN — Medication 15 MILLIGRAM(S): at 19:12

## 2022-10-22 RX ADMIN — ONDANSETRON 4 MILLIGRAM(S): 8 TABLET, FILM COATED ORAL at 15:04

## 2022-10-22 RX ADMIN — MORPHINE SULFATE 4 MILLIGRAM(S): 50 CAPSULE, EXTENDED RELEASE ORAL at 15:05

## 2022-10-22 RX ADMIN — SODIUM CHLORIDE 1000 MILLILITER(S): 9 INJECTION INTRAMUSCULAR; INTRAVENOUS; SUBCUTANEOUS at 15:04

## 2022-10-22 RX ADMIN — MORPHINE SULFATE 4 MILLIGRAM(S): 50 CAPSULE, EXTENDED RELEASE ORAL at 15:03

## 2022-10-22 NOTE — ED PROVIDER NOTE - NSFOLLOWUPINSTRUCTIONS_ED_ALL_ED_FT
(1) Follow up with your primary care physician and ob/gyn physician as discussed. Please follow up with your GYN physician in two weeks     (2) Immediately seek care at your nearest emergency room if your symptoms worsen, persist, or do not resolve     (3) Take the prescribed medication as instructed:   -  -        Acute Nausea and Vomiting    WHAT YOU NEED TO KNOW:    Acute means the nausea and vomiting starts suddenly, gets worse quickly, and lasts a short time. There are many possible causes of acute nausea and vomiting.    DISCHARGE INSTRUCTIONS:    Call your local emergency number (911 in the [place]) if:   •You have chest pain.      •You have severe pain or cramping in your abdomen.      •Your vision is blurred.      •You are confused, have a high fever, or a stiff neck.      •You have bright red blood coming from your rectum.      •Your vomit smells like bowel movement.      Return to the emergency department if:   •You have a severe headache or pain.      •You are dizzy, cold, and thirsty, and your eyes and mouth are dry.      •You are urinating very little or not at all.      •You are dizzy or lightheaded when you stand up.      •You see blood or material that looks like coffee grounds in your vomit.      Call your doctor if:   •You continue to vomit for more than 48 hours.      •Your nausea and vomiting does not get better or go away after you use medicine.      •You have questions or concerns about your condition or care.      Medicines: You may need any of the following:   •Medicines may be given to calm your stomach and stop your vomiting. You may also need medicines to help empty your stomach and bowels.      •Take your medicine as directed. Contact your healthcare provider if you think your medicine is not helping or if you have side effects. Tell your provider if you are allergic to any medicine. Keep a list of the medicines, vitamins, and herbs you take. Include the amounts, and when and why you take them. Bring the list or the pill bottles to follow-up visits. Carry your medicine list with you in case of an emergency.      Manage your symptoms:   •Rest as much as you can. Too much activity can make your nausea worse.      •Drink more liquids to prevent dehydration. Take small sips. Try drinks such as ginger ale, lemonade, water, or tea. Your provider may recommend that you drink an oral rehydration solution (ORS). ORS contains water, salts, and sugar that are needed to replace the lost body fluids.      •Eat smaller meals, more often. Try bland foods and avoid spices or strong flavors      •Do not drink alcohol. Alcohol may upset or irritate your stomach.      Follow up with your doctor as directed: Write down your questions so you remember to ask them during your visits.    -------------            Abdominal Pain    WHAT YOU NEED TO KNOW:    Abdominal pain can be dull, achy, or sharp. You may have pain in one area of your abdomen, or in your entire abdomen. Your pain may be caused by a condition such as constipation, food sensitivity or poisoning, infection, or a blockage. Abdominal pain can also be from a hernia, appendicitis, or an ulcer. Liver, gallbladder, or kidney conditions can also cause abdominal pain. The cause of your abdominal pain may not be known.  Abdominal Organs         DISCHARGE INSTRUCTIONS:    Call your local emergency number (911 in the ) if:   •You have chest pain or shortness of breath.          Return to the emergency department if:   •You have pulsing pain in your upper abdomen or lower back that suddenly becomes constant.      •Your pain is in the right lower abdominal area and worsens with movement.      •You have a fever over 100.4°F (38°C) or shaking chills.      •You are vomiting and cannot keep food or liquids down.      •Your pain does not improve or gets worse over the next 8 to 12 hours.      •You see blood in your vomit or bowel movements, or they look black and tarry.      •Your skin or the whites of your eyes turn yellow.      •You are a woman and have a large amount of vaginal bleeding that is not your monthly period.      Call your doctor if:   •You have pain in your lower back.      •You are a man and have pain in your testicles.      •You have pain when you urinate.      •You have questions or concerns about your condition or care.      Medicines: You may need any of the following:  •Medicines may be given to calm your stomach or prevent vomiting.      •Prescription pain medicine may be given. Ask your healthcare provider how to take this medicine safely. Some prescription pain medicines contain acetaminophen. Do not take other medicines that contain acetaminophen without talking to your healthcare provider. Too much acetaminophen may cause liver damage. Prescription pain medicine may cause constipation. Ask your healthcare provider how to prevent or treat constipation.       •Take your medicine as directed. Contact your healthcare provider if you think your medicine is not helping or if you have side effects. Tell your provider if you are allergic to any medicine. Keep a list of the medicines, vitamins, and herbs you take. Include the amounts, and when and why you take them. Bring the list or the pill bottles to follow-up visits. Carry your medicine list with you in case of an emergency.      Manage or prevent abdominal pain:   •Apply heat on your abdomen for 20 to 30 minutes every 2 hours for as many days as directed. Heat helps decrease pain and muscle spasms.      •Make changes to the foods you eat, if needed. Do not eat foods that cause abdominal pain or other symptoms. Eat small meals more often. The following changes may also help:?Eat more high-fiber foods if you are constipated. High-fiber foods include fruits, vegetables, whole-grain foods, and legumes such as [place] beans.             ?Do not eat foods that cause gas if you have bloating. Examples include broccoli, cabbage, beans, and carbonated drinks.      ?Do not eat foods or drinks that contain sorbitol or fructose if you have diarrhea and bloating. Some examples are fruit juices, candy, jelly, and sugar-free gum.      ?Do not eat high-fat foods. Examples include fried foods, cheeseburgers, hot dogs, and desserts.      •Make changes to the liquids you drink, if needed. Do not drink liquids that cause pain or make it worse, such as orange juice. Drink liquids throughout the day to stay hydrated. The following changes may also help:?Drink more liquids to prevent dehydration from diarrhea or vomiting. Ask your healthcare provider how much liquid to drink each day and which liquids are best for you.      ?Limit or do not have caffeine. Caffeine may make symptoms such as heartburn or nausea worse.      ?Limit or do not drink alcohol. Alcohol can make your abdominal pain worse. Ask your healthcare provider if it is okay for you to drink alcohol. Also ask how much is okay for you to drink. A drink of alcohol is 12 ounces of beer, ½ ounce of liquor, or 5 ounces of wine.      •Keep a diary of your abdominal pain. A diary may help your healthcare provider learn what is causing your pain. Include when the pain happens, how long it lasts, and what the pain feels like. Write down any other symptoms you have with abdominal pain. Also write down what you eat, and any symptoms you have after you eat.      •Manage stress. Stress may cause abdominal pain. Your healthcare provider may recommend relaxation techniques and deep breathing exercises to help decrease your stress. Your healthcare provider may recommend you talk to someone about your stress or anxiety, such as a counselor or a friend. Get plenty of sleep. Exercise regularly.    •Do not smoke. Nicotine and other chemicals in cigarettes can damage your esophagus and stomach. Ask your healthcare provider for information if you currently smoke and need help to quit. E-cigarettes or smokeless tobacco still contain nicotine. Talk to your healthcare provider before you use these products.      Follow up with your doctor as directed: Write down your questions so you remember to ask them during your visits.

## 2022-10-22 NOTE — ED PROVIDER NOTE - PHYSICAL EXAMINATION
VITALS: reviewed  GEN: NAD, A & O x 4  HEAD/EYES: NCAT, EOMI, anicteric sclerae,   ENT: mucus membranes moist, oropharynx WNL, trachea midline,  RESP: lungs CTA with equal breath sounds bilaterally, chest wall nontender and atraumatic  CV: heart with reg rhythm S1, S2, distal pulses intact and symmetric bilaterally  ABDOMEN: soft, nondistended, mild tendernses overlying pelvic region/surgical sites-- no erythema/warmth, no induration, no palpable masses  : no CVAT  MSK: extremities atraumatic and nontender, no edema, no asymmetry.  SKIN: warm, dry, no rash, incision site cdi, no cyanosis. color appropriate for ethnicity.  NEURO: alert, mentating appropriately, no facial asymmetry.   PSYCH: Affect appropriate

## 2022-10-22 NOTE — ED ADULT NURSE NOTE - OBJECTIVE STATEMENT
32 y/o female c/o nausea and vomiting. pt states to have been discharged this morning s/p ectopic pregnancy, she has had vomiting since tuesday. pt states she went home and couldn't take any PO meds. pt aox4, resp even unlabored, no episodes of emesis in ED. will continue to monitor

## 2022-10-22 NOTE — CONSULT NOTE ADULT - SUBJECTIVE AND OBJECTIVE BOX
YUDI HARTMAN  31y  Female [id number]    HPI:  32 y/o  presenting to ED with primary complaint of abdominal pain. Patient is s/p uncomplicated diagnostic laparoscopy and D&C (EBL 5) on 10/21 for r/o ectopic pregnancy.    Patient states that after she was discharged earlier this AM she was doing well. Able to tolerate french fries without nausea, vomiting. Denies fevers, chills, heavy vaginal bleeding, abnormal vaginal discharge, lightheadedness, dizziness. Had some abdominal pain at home so attempted to take the prescribed Oxycodone however was unable to tolerate the pills and threw up. As such abdominal pain did not resolve and she presented to the ED. Notes that something similar happened earlier this week where she tried to take pills but had vomiting, and that in the past she had similar reactions when taking pills in general.     At time of evaluation, patient stated that she felt much better, without nausea/vomiting, or severe abdominal pain after receiving pain medications. Rating pain as a 2/10.     Name of GYN Physician:     PMHX; Asthma  PSHX; denies surgical hx  POBHX;   PGYNHX: denies hx of fibroids, ovarian cysts, endometriosis,   SOCIAL: denies e/t/d use  Allergies: NKDA  MEDS: Tylenol    Vital Signs Last 24 Hrs  T(C): 36.8 (22 Oct 2022 19:06), Max: 36.9 (21 Oct 2022 23:20)  T(F): 98.3 (22 Oct 2022 19:06), Max: 98.4 (21 Oct 2022 23:20)  HR: 72 (22 Oct 2022 19:06) (71 - 93)  BP: 127/79 (22 Oct 2022 19:06) (122/74 - 156/89)  BP(mean): 88 (22 Oct 2022 01:30) (81 - 89)  RR: 16 (22 Oct 2022 19:06) (12 - 18)  SpO2: 100% (22 Oct 2022 19:06) (97% - 100%)    Parameters below as of 22 Oct 2022 19:06  Patient On (Oxygen Delivery Method): room air    Physical Exam:     General: sitting comfortably upright in bed, NAD   HEENT: neck supple, full ROM  CV: RR S1S2 no m/r/g  Lungs: CTA b/l, good air flow b/l   Back: No CVA tenderness  Abd: Soft, appropriately tender, non-distended. LSC incisions x3 C/D/I with steristrips intact. Bowel sounds present.    :  Minimal bleeding on pad.  Ext: non-tender b/l, no edema       LABS:            8.7    11.17 )-----------( 402      ( 22 Oct 2022 14:54 )             30.2     10-22    137  |  105  |  7   ----------------------------<  103<H>  3.7   |  23  |  0.73    Ca    8.6      22 Oct 2022 14:54    TPro  6.5  /  Alb  3.9  /  TBili  0.2  /  DBili  x   /  AST  16  /  ALT  9   /  AlkPhos  58  10-22    I&O's Detail    PT/INR - ( 21 Oct 2022 18:34 )   PT: 13.8 sec;   INR: 1.19 ratio        PTT - ( 21 Oct 2022 18:34 )  PTT:28.0 sec  Urinalysis Basic - ( 21 Oct 2022 16:13 )    Color: Light Brown / Appearance: Clear / S.004 / pH: x  Gluc: x / Ketone: Trace  / Bili: Negative / Urobili: <2 mg/dL   Blood: x / Protein: 30 mg/dL / Nitrite: Negative   Leuk Esterase: Large / RBC: 3 /HPF / WBC 9 /HPF   Sq Epi: x / Non Sq Epi: 5 /HPF / Bacteria: Occasional    RADIOLOGY & ADDITIONAL STUDIES: YUDI HARTMAN  31y  Female [id number]    HPI:  30 y/o  presenting to ED with primary complaint of abdominal pain. Patient is s/p uncomplicated diagnostic laparoscopy and D&C (EBL 5) on 10/21 for r/o ectopic pregnancy.    Patient states that after she was discharged earlier this AM she was doing well. Able to tolerate french fries without nausea, vomiting. Denies fevers, chills, heavy vaginal bleeding, abnormal vaginal discharge, lightheadedness, dizziness. Had some abdominal pain at home so attempted to take the prescribed Oxycodone however was unable to tolerate the pills and threw up. As such abdominal pain did not resolve and she presented to the ED. Notes that something similar happened earlier this week where she tried to take pills but had vomiting, and that in the past she had similar reactions when taking pills in general.     At time of evaluation, patient stated that she felt much better, without nausea/vomiting, or severe abdominal pain after receiving pain medications. Rating pain as a 2/10.     Name of GYN Physician:     PMHX; Asthma  PSHX; denies surgical hx  POBHX;   PGYNHX: denies hx of fibroids, ovarian cysts, endometriosis,   SOCIAL: denies e/t/d use  Allergies: NKDA  MEDS: Tylenol    Vital Signs Last 24 Hrs  T(C): 36.8 (22 Oct 2022 19:06), Max: 36.9 (21 Oct 2022 23:20)  T(F): 98.3 (22 Oct 2022 19:06), Max: 98.4 (21 Oct 2022 23:20)  HR: 72 (22 Oct 2022 19:06) (71 - 93)  BP: 127/79 (22 Oct 2022 19:06) (122/74 - 156/89)  BP(mean): 88 (22 Oct 2022 01:30) (81 - 89)  RR: 16 (22 Oct 2022 19:06) (12 - 18)  SpO2: 100% (22 Oct 2022 19:06) (97% - 100%)    Parameters below as of 22 Oct 2022 19:06  Patient On (Oxygen Delivery Method): room air    Physical Exam:     General: sitting comfortably upright in bed, NAD   HEENT: neck supple, full ROM  CV: RR S1S2 no m/r/g  Lungs: CTA b/l, good air flow b/l   Back: No CVA tenderness  Abd: Soft, appropriately tender, non-distended. LSC incisions x3 C/D/I with steristrips intact. Bowel sounds present.    :  Minimal bleeding on pad.  Ext: non-tender b/l, no edema       LABS:            8.7    11.17 )-----------( 402      ( 22 Oct 2022 14:54 )             30.2     10-22    137  |  105  |  7   ----------------------------<  103<H>  3.7   |  23  |  0.73    Ca    8.6      22 Oct 2022 14:54    TPro  6.5  /  Alb  3.9  /  TBili  0.2  /  DBili  x   /  AST  16  /  ALT  9   /  AlkPhos  58  10-22    I&O's Detail    PT/INR - ( 21 Oct 2022 18:34 )   PT: 13.8 sec;   INR: 1.19 ratio        PTT - ( 21 Oct 2022 18:34 )  PTT:28.0 sec  Urinalysis Basic - ( 21 Oct 2022 16:13 )    Color: Light Brown / Appearance: Clear / S.004 / pH: x  Gluc: x / Ketone: Trace  / Bili: Negative / Urobili: <2 mg/dL   Blood: x / Protein: 30 mg/dL / Nitrite: Negative   Leuk Esterase: Large / RBC: 3 /HPF / WBC 9 /HPF   Sq Epi: x / Non Sq Epi: 5 /HPF / Bacteria: Occasional    RADIOLOGY & ADDITIONAL STUDIES:  < from: CT Abdomen and Pelvis w/ Oral Cont and w/ IV Cont (10.22.22 @ 23:53) >  ACC: [id number] EXAM:  CT ABDOMEN AND PELVIS OC IC                          PROCEDURE DATE:  10/22/2022      INTERPRETATION:  CLINICAL INFORMATION: Abdominal pain for one day. Status   post D&C one day ago for ruptured ectopic pregnancy.    COMPARISON: None.    CONTRAST/COMPLICATIONS:  IV Contrast: Omnipaque 350  90 cc administered   10 cc discarded  Oral Contrast: Omnipaque 300   Fruit 2o  Complications: None reported at time of study completion    PROCEDURE:  CT of the Abdomen and Pelvis was performed.  Sagittal and coronal reformats were performed.    FINDINGS:  LOWER CHEST: Minimal dependent subsegmental atelectasis in both lung   bases.    LIVER: Within normal limits.  BILE DUCTS: Normal caliber.  GALLBLADDER: Within normal limits.  SPLEEN: Within normal limits.  PANCREAS: Within normal limits.  ADRENALS: Within normal limits.  KIDNEYS/URETERS: A too small to characterize left renal hypodensity.    BLADDER: Within normal limits.  REPRODUCTIVE ORGANS: Heterogeneous fluid within the endometrium, likely   blood products. Adnexa within normal limits.    BOWEL: No bowel obstruction. Appendix is normal.  PERITONEUM: Trace scattered pneumoperitoneum, likely postoperative. Small   amount of free fluid in the pelvic cul-de-sac. No rim-enhancing fluid   collection to suggest intraperitoneal abscess.  VESSELS: Within normal limits.  RETROPERITONEUM/LYMPH NODES: No lymphadenopathy.  ABDOMINAL WALL: Postoperative changes in the anterior abdominal wall.   Innumerable soft tissue densities and fat stranding in the gluteal   regions bilaterally, possibly related to prior cosmetic injections. There   is a peripherally enhancing/thick-walled collection in the left buttock   measuring 3.6 x 7.8 cm on axial images and partially extendinginto the   left gluteus mary muscle.  BONES: L5 pars defects bilaterally without spondylolisthesis.    IMPRESSION:  -Trace pneumoperitoneum, likely related to recent laparoscopy.  -Heterogeneous endometrial fluid, which may represent blood products.  -Small amount of fluid in the pelvic cul-de-sac, although no evidence for   intraperitoneal abscess.  -Incidentally noted peripheral enhancing/thick-walled collection in the   left buttock extending into the gluteus mary muscle, measuring   approximately 3.6 x 7.8 cm on axial images, may represent an abscess,   seroma, hematoma, correlate with history of prior cosmetic surgery in   this region.    --- End of Report ---    DEZ CHIANG MD; Resident Radiologist  This document has been electronically signed.  SUSAN BUSTAMANTE MD; Attending Radiologist  This document has been electronically signed. Oct 23 2022  1:02AM    < end of copied text >

## 2022-10-22 NOTE — ED PROVIDER NOTE - CLINICAL SUMMARY MEDICAL DECISION MAKING FREE TEXT BOX
32 yo F presenting with n/v s/p D&C-- unable to tolerate pain meds. Will obtain labs, tx ivf, anti-emetics and pain meds. likely po chall and dc home with zofran and pain meds

## 2022-10-22 NOTE — ED ADULT TRIAGE NOTE - CHIEF COMPLAINT QUOTE
Patient has c/o severe abdominal pain. She had surgical procedure done yesterday as a flush out for ectopic pregnancy and was given medication. Unable to hold pain meds down, vomiting, and pain in abdominal area. Pt has 3 incision areas.

## 2022-10-22 NOTE — CONSULT NOTE ADULT - ASSESSMENT
30y/o  s/p uncomplicated diagnostic laparoscopy and D&C (EBL 5) 10/21 presenting to ED with abdominal pain and nausea/vomiting, likely 2/2 inability to take prescribed pain medication due to inability to tolerate pills.    #Abdominal Pain  - Labs reviewed: H/H 8.7/30.2, WBC 11.17  - Benign abdominal exam, pt reporting symptomatic improvement  - Pending CTAP w/ PO/IV contrast    to be d/w service attending pending workup  Alyssa Paredes  PGY-2   32y/o  s/p uncomplicated diagnostic laparoscopy and D&C (EBL 5) 10/21 presenting to ED with abdominal pain and nausea/vomiting, likely 2/2 inability to take prescribed pain medication due to inability to tolerate pills.    #Abdominal Pain  - Labs reviewed: H/H 8.7/30.2, WBC 11.17  - Benign abdominal exam, pt reporting symptomatic improvement  - CTAP (10/22): postoperative changes in anterior abdominal wall, trace pneumoperitoneum likely related to recent laparoscopy  - No acute gyn intervention; primary management per ED  - Recommend d/c with liquid pain medications  - To f/u with clinic in 2 weeks for post-operative visit  - RTED if fevers, chills, severe abdominal pain    d/w service attending Dr Rohit Paredes  PGY-2

## 2022-10-22 NOTE — ED PROVIDER NOTE - PATIENT PORTAL LINK FT
You can access the FollowMyHealth Patient Portal offered by [facility] by registering at the following website: [email or web address]. By joining [facility]’s FollowMyHealth portal, you will also be able to view your health information using other applications (apps) compatible with our system.

## 2022-10-22 NOTE — CHART NOTE - NSCHARTNOTEFT_GEN_A_CORE
R1 GYN POST-OP CHECK NOTE    SUBJECTIVE:    32yo F now POD0 s/p dx LSC and D&C.    Pt reports pain well controlled by pain meds.  She is sitting in bed without dizziness.  Dinero catheter removed in OR and pt feels the urge to void.  She is tolerating sips of clear liquids w/o N/V.  She denies fever, chills, nausea, vomiting, headache, dizziness, chest pain, palpitations, shortness of breath.    fentaNYL    Injectable 25 MICROGram(s) IV Push every 5 minutes PRN  HYDROmorphone  Injectable 0.5 milliGRAM(s) IV Push every 15 minutes PRN  lactated ringers. 1000 milliLiter(s) IV Continuous <Continuous>  ondansetron Injectable 4 milliGRAM(s) IV Push once PRN  ondansetron Injectable 4 milliGRAM(s) IV Push once PRN  oxyCODONE    IR 5 milliGRAM(s) Oral once PRN      OBJECTIVE:    VITAL SIGNS:  Vital Signs Last 24 Hrs  T(C): 36.7 (22 Oct 2022 00:00), Max: 36.9 (21 Oct 2022 23:20)  T(F): 98.1 (22 Oct 2022 00:00), Max: 98.4 (21 Oct 2022 23:20)  HR: 78 (22 Oct 2022 00:15) (73 - 93)  BP: 131/74 (22 Oct 2022 00:15) (122/74 - 136/78)  BP(mean): 86 (22 Oct 2022 00:15) (84 - 89)  RR: 13 (22 Oct 2022 00:15) (12 - 17)  SpO2: 100% (22 Oct 2022 00:15) (97% - 100%)    Parameters below as of 22 Oct 2022 00:15  Patient On (Oxygen Delivery Method): room air      CAPILLARY BLOOD GLUCOSE            PHYSICAL EXAM:  GEN: NAD, A&Ox3  CV: RRR, no m/g/r  LUNGS: CTAB  ABD: soft, appropriately tender, ND, +BS  Incision: CDI, dressings in place  EXT: WWP, no edema/TTP    LABS:    CBC: 10-21-22 @ 16:13          9.8  5.97 )-------( 409          32.9        BMP: 10-21-22 @ 16:13  135|101|6  ------------------<106  3.5|24|0.74    AST/ALT: 14/9      ASSESSMENT:  32yo F now POD0 s/p dx LSC and D&C.  Patient is stable and progressing normally postoperatively.    NEURO: pain well controlled on current regimen  CV: hemodyamically stable  PULM: increase incentive spirometry  GI: advance diet as tolerated  : continue dinero  HEME: SCDs, early ambulation  ID: afebrile    Kala Bello, PGY2

## 2022-10-22 NOTE — ED ADULT NURSE REASSESSMENT NOTE - NS ED NURSE REASSESS COMMENT FT1
Break coverage RN: pt A&Ox4, respirations are even and unlabored, sating at 100% on RA, 20 G to L AC flushes without difficulty. Pt c/o 5/10 abdominal pain, resident notified. Still pending CT.

## 2022-10-22 NOTE — ED PROVIDER NOTE - OBJECTIVE STATEMENT
30 yo F hx asthma,  lmp 2022 s/p D&C one day ago for rupture ectopic pregnancy, presenting with c/o nausea/vomiting and inability to tolerate PO pain meds (oxycodone) at home. Pt endorses abdominal and back pain s/p surgery that feels sore. Light vaginal bleeding. Denies cp/sob/palpitations or feeling light headed/dizzy.

## 2022-10-22 NOTE — ED PROVIDER NOTE - PROGRESS NOTE DETAILS
O'Jonathan DO PGY-3: received sign out on this patient. O'Hertford DO PGY-3: ob/gyn in ED, aware of pt and consulted. GYN requesting oral contrast for the CT scan O'Teller DO PGY-3: GYN aware of CTr and gave clearance for DC. In ED pt passed PO trial. Will dc w/ zofran. Discussed return precautions. Pt did have prior operative procedure to buttock (see clinical correlation note from [facility]) one yr ago but pt does not remember the name of the procedure. Denies any pain or swelling to the area.

## 2022-10-23 VITALS
DIASTOLIC BLOOD PRESSURE: 80 MMHG | SYSTOLIC BLOOD PRESSURE: 121 MMHG | OXYGEN SATURATION: 100 % | RESPIRATION RATE: 18 BRPM | TEMPERATURE: 99 F | HEART RATE: 60 BPM

## 2022-10-23 RX ORDER — ONDANSETRON 8 MG/1
1 TABLET, FILM COATED ORAL
Qty: 9 | Refills: 0
Start: 2022-10-23 | End: 2022-10-25

## 2022-10-23 RX ORDER — ONDANSETRON 8 MG/1
4 TABLET, FILM COATED ORAL ONCE
Refills: 0 | Status: COMPLETED | OUTPATIENT
Start: 2022-10-23 | End: 2022-10-23

## 2022-10-23 RX ADMIN — ONDANSETRON 4 MILLIGRAM(S): 8 TABLET, FILM COATED ORAL at 02:53

## 2022-10-23 NOTE — ED ADULT NURSE REASSESSMENT NOTE - NS ED NURSE REASSESS COMMENT FT1
Pt A&O x 4. In NAD at this time. CT result pending. Stretcher in lowest position, wheels locked, appropriate side rails in place, call bell in reach.

## 2022-10-23 NOTE — CHART NOTE - NSCHARTNOTEFT_GEN_A_CORE
Pt called after she presented to the ED today due to not being able to tolerate PO oxycodone. Pt now taking liquid tylenol. Pt states pain is well controlled. Denies fevers, chills, nausea, vomiting.     bHC (10/21) -> 686 (10/23).     Significant drop in bHCG after D&C. Pt no longer needs to follow up for a bHCG appt this week. Pt will present for post op appt in 2 weeks.     Kala Bello, PGY2  d/w Dr. Borja

## 2022-10-24 PROBLEM — J45.909 UNSPECIFIED ASTHMA, UNCOMPLICATED: Chronic | Status: ACTIVE | Noted: 2022-10-21

## 2022-10-24 PROBLEM — Z00.00 ENCOUNTER FOR PREVENTIVE HEALTH EXAMINATION: Status: ACTIVE | Noted: 2022-10-24

## 2022-10-25 ENCOUNTER — APPOINTMENT (OUTPATIENT)
Dept: OBGYN | Facility: HOSPITAL | Age: 32
End: 2022-10-25

## 2022-10-27 LAB — SURGICAL PATHOLOGY STUDY: SIGNIFICANT CHANGE UP

## 2022-10-28 ENCOUNTER — NON-APPOINTMENT (OUTPATIENT)
Age: 32
End: 2022-10-28

## 2022-11-09 ENCOUNTER — OUTPATIENT (OUTPATIENT)
Dept: OUTPATIENT SERVICES | Facility: HOSPITAL | Age: 32
LOS: 1 days | End: 2022-11-09

## 2022-11-09 ENCOUNTER — APPOINTMENT (OUTPATIENT)
Dept: OBGYN | Facility: HOSPITAL | Age: 32
End: 2022-11-09

## 2022-11-09 VITALS
HEIGHT: 66 IN | WEIGHT: 165 LBS | HEART RATE: 74 BPM | DIASTOLIC BLOOD PRESSURE: 79 MMHG | SYSTOLIC BLOOD PRESSURE: 130 MMHG | BODY MASS INDEX: 26.52 KG/M2 | TEMPERATURE: 97.3 F

## 2022-11-09 DIAGNOSIS — Z30.9 ENCOUNTER FOR CONTRACEPTIVE MANAGEMENT, UNSPECIFIED: ICD-10-CM

## 2022-11-09 DIAGNOSIS — Z09 ENCOUNTER FOR FOLLOW-UP EXAMINATION AFTER COMPLETED TREATMENT FOR CONDITIONS OTHER THAN MALIGNANT NEOPLASM: ICD-10-CM

## 2022-11-09 DIAGNOSIS — Z98.890 OTHER SPECIFIED POSTPROCEDURAL STATES: Chronic | ICD-10-CM

## 2022-11-09 PROCEDURE — 99213 OFFICE O/P EST LOW 20 MIN: CPT | Mod: GC

## 2022-11-23 NOTE — ASSESSMENT
[de-identified] : 30yo  POD#19 s/p dx lsc, D&C for PUL. Patient progressing well postoperatively, no concerns. D&C pathology demonstrating POC consistent with miscarriage. \par \par - Pathology reviewed with patient, she is aware that she had a miscarriage and NOT an ectopic pregnancy\par - Patient requesting contraception, counseled on r/b/a of OCPs, nuvaring, patch, depo, IUD, implant. Patient is considering Mirena IUD. Informational material given and patient will make an appointment for placement when she decides\par - Patient cleared for all activities postoperatively \par \par Anthony ALVES PGY3 \par d/w Dr. Borja

## 2022-11-23 NOTE — HISTORY OF PRESENT ILLNESS
[Pain is well-controlled] : pain is well-controlled [Clean/Dry/Intact] : clean, dry and intact [None] : no vaginal bleeding [Pathology reviewed] : pathology reviewed [Fever] : no fever [Chills] : no chills [Nausea] : no nausea [Vomiting] : no vomiting [Diarrhea] : no diarrhea [Vaginal Bleeding] : no vaginal bleeding [Pelvic Pressure] : no pelvic pressure [Dysuria] : no dysuria [Vaginal Discharge] : no vaginal discharge [de-identified] : 19 [de-identified] : dx lsc, D&C [de-identified] : pregnancy of unkown location [de-identified] : 32yo  POD#19 s/p dx lsc, D&C for pregnancy of unknown location. Post-op course notable for eval in ED for nausea and vomiting in setting of not tolerating pain medication in pill form. Work up was benign and pain control improved with liquid medication. Today, patient feels well. Pain is well controlled. Denies bleeding, abdominal pain, abnormal discharge, fevers/chills, nasuea/vomiting.

## 2022-11-28 ENCOUNTER — APPOINTMENT (OUTPATIENT)
Dept: OBGYN | Facility: HOSPITAL | Age: 32
End: 2022-11-28

## 2022-12-15 NOTE — ED ADULT TRIAGE NOTE - HAVE YOU HAD COVID IN THE LAST 60 DAYS?
Quality 111:Pneumonia Vaccination Status For Older Adults: Pneumococcal vaccine (PPSV23) administered on or after patientâs 60th birthday and before the end of the measurement period No

## 2022-12-16 ENCOUNTER — APPOINTMENT (OUTPATIENT)
Dept: OBGYN | Facility: HOSPITAL | Age: 32
End: 2022-12-16

## 2023-01-20 NOTE — ASU DISCHARGE PLAN (ADULT/PEDIATRIC) - ***IN THE EVENT THAT YOU DEVELOP A COMPLICATION AND YOU ARE UNABLE TO REACH YOUR OWN PHYSICIAN, YOU MAY CONTACT:
Statement Selected MEDICATIONS  (STANDING):  aMIOdarone    Tablet 200 milliGRAM(s) Oral two times a day  apixaban 5 milliGRAM(s) Oral two times a day  ascorbic acid 500 milliGRAM(s) Oral daily  aspirin  chewable 81 milliGRAM(s) Oral daily  atorvastatin 40 milliGRAM(s) Oral at bedtime  buMETAnide 1 milliGRAM(s) Oral daily  ferrous    sulfate 325 milliGRAM(s) Oral daily  finasteride 5 milliGRAM(s) Oral daily  insulin lispro (ADMELOG) corrective regimen sliding scale   SubCutaneous three times a day before meals  insulin lispro (ADMELOG) corrective regimen sliding scale   SubCutaneous at bedtime  lactulose Syrup 20 Gram(s) Oral daily  metoprolol tartrate 25 milliGRAM(s) Oral two times a day  predniSONE   Tablet 40 milliGRAM(s) Oral daily  sevelamer carbonate 1600 milliGRAM(s) Oral three times a day with meals  sodium zirconium cyclosilicate 10 Gram(s) Oral once  tamsulosin 0.4 milliGRAM(s) Oral at bedtime    MEDICATIONS  (PRN):  acetaminophen     Tablet .. 650 milliGRAM(s) Oral every 6 hours PRN Temp greater or equal to 38C (100.4F), Mild Pain (1 - 3)  albuterol    90 MICROgram(s) HFA Inhaler 2 Puff(s) Inhalation every 6 hours PRN Shortness of Breath and/or Wheezing  zolpidem 5 milliGRAM(s) Oral at bedtime PRN Insomnia  zolpidem 5 milliGRAM(s) Oral at bedtime PRN Insomnia

## (undated) DEVICE — WARMING BLANKET FULL ADULT

## (undated) DEVICE — PREP BETADINE SPONGE STICKS

## (undated) DEVICE — UTERINE MANIPULATOR COOPER SURGICAL 5MM 33CM GREEN

## (undated) DEVICE — SOL IRR POUR H2O 500ML

## (undated) DEVICE — INSUFFLATION NDL COVIDIEN SURGINEEDLE VERESS 120MM

## (undated) DEVICE — GLV 7.5 PROTEXIS (CREAM) MICRO

## (undated) DEVICE — UTERINE MANIPULATOR CLINICAL INNOVATIONS CLEARVIEW 7CM

## (undated) DEVICE — PACK D&C

## (undated) DEVICE — DRAPE LIGHT HANDLE COVER (GREEN)

## (undated) DEVICE — PACK PERI GYN

## (undated) DEVICE — SYR LUER LOK 10CC

## (undated) DEVICE — SOL IRR POUR NS 0.9% 500ML

## (undated) DEVICE — SUT VICRYL 0 27" UR-6

## (undated) DEVICE — DRSG PAD SANITARY OB

## (undated) DEVICE — VENODYNE/SCD SLEEVE CALF MEDIUM

## (undated) DEVICE — POSITIONER STRAP ARMBOARD VELCRO TS-30

## (undated) DEVICE — DRSG MASTISOL

## (undated) DEVICE — GLV 7 PROTEXIS (WHITE)

## (undated) DEVICE — DRAPE TOWEL BLUE 17" X 24"

## (undated) DEVICE — TROCAR COVIDIEN VERSAPORT BLADELESS OPTICAL 5MM STANDARD

## (undated) DEVICE — BASIN SET SINGLE

## (undated) DEVICE — BLADE SURGICAL #15 CARBON

## (undated) DEVICE — FOLEY TRAY 16FR LF URINE METER SURESTEP

## (undated) DEVICE — TUBING OLYMPUS INSUFFLATION

## (undated) DEVICE — TROCAR COVIDIEN VERSAONE BLADED FIXATION 11MM STANDARD

## (undated) DEVICE — POSITIONER PINK PAD PIGAZZI SYSTEM

## (undated) DEVICE — POSITIONER PURPLE ARM ONE STEP (LARGE)

## (undated) DEVICE — DRSG TEGADERM 2.5X3"

## (undated) DEVICE — PACK GENERAL LAPAROSCOPY

## (undated) DEVICE — SUT MONOCRYL 4-0 27" PS-2 UNDYED